# Patient Record
Sex: MALE | Race: WHITE | NOT HISPANIC OR LATINO | URBAN - NONMETROPOLITAN AREA
[De-identification: names, ages, dates, MRNs, and addresses within clinical notes are randomized per-mention and may not be internally consistent; named-entity substitution may affect disease eponyms.]

---

## 2019-07-24 ENCOUNTER — OFFICE VISIT (OUTPATIENT)
Dept: MEDICAL GROUP | Facility: CLINIC | Age: 69
End: 2019-07-24
Payer: MEDICARE

## 2019-07-24 VITALS
HEART RATE: 94 BPM | TEMPERATURE: 99.3 F | HEIGHT: 67 IN | RESPIRATION RATE: 16 BRPM | DIASTOLIC BLOOD PRESSURE: 93 MMHG | BODY MASS INDEX: 31.23 KG/M2 | SYSTOLIC BLOOD PRESSURE: 152 MMHG | OXYGEN SATURATION: 98 % | WEIGHT: 199 LBS

## 2019-07-24 DIAGNOSIS — Z85.828 HISTORY OF SKIN CANCER: ICD-10-CM

## 2019-07-24 DIAGNOSIS — K63.5 POLYP OF COLON, UNSPECIFIED PART OF COLON, UNSPECIFIED TYPE: ICD-10-CM

## 2019-07-24 DIAGNOSIS — S61.216A LACERATION OF RIGHT LITTLE FINGER WITHOUT FOREIGN BODY WITHOUT DAMAGE TO NAIL, INITIAL ENCOUNTER: ICD-10-CM

## 2019-07-24 DIAGNOSIS — R03.0 ELEVATED BLOOD PRESSURE READING: ICD-10-CM

## 2019-07-24 DIAGNOSIS — E78.49 OTHER HYPERLIPIDEMIA: ICD-10-CM

## 2019-07-24 PROCEDURE — 99202 OFFICE O/P NEW SF 15 MIN: CPT | Performed by: PHYSICIAN ASSISTANT

## 2019-07-24 NOTE — PROGRESS NOTES
cc:  followup laceration finger    Subjective:     Jasvir Powers is a 69 y.o. male presenting for follow up laceration finger      Patient presents to the office for follow up laceration finger.  We saw him just briefly yesterday when he came in for a laceration.  However I was concerned as he indicated some decreased mobility and I could actually see the tendon in the incision.  Advised patient that he should go to the ER which she did.  He was seen at [facility].  Since having sutures placed, he has not been able to move his finger.  he can bend the pinky at the MCP joint, but not the DIP joint or PIP joint.  He feels the pain is mild.      BP will run 120/80's at home.  He does monitor it frequently.  He will let us know if it trends up.  He is taking wormwood for his blood pressure.  He also acknowledges a history of upper lipidemia indicating that his cholesterol in the past is usually between 200-2 40.    Patient indicates that he is due for a colonoscopy.  He has a history of colonic polyps which have been an issue since the age of 31.  He states that his last colonoscopy was approximately 2 years ago and was instructed by his gastroenterologist to return in a year.  He is not wanting me to place referral at this time as he is not ready to see them.  He would like to take care of his finger first.    Patient also reports a history of skin cancers indicating that he does need to follow-up with dermatology on a routine basis.  I also offered patient dermatology referral at this time but he would like to hold off again indicating that he would prefer to take care of his finger first.    Review of systems:  See above. Denies any other symptoms unless previously indicate.      No current outpatient prescriptions on file.    Allergies, past medical history, past surgical history, family history, social history reviewed and updated    Objective:     Vitals: /93 (BP Location: Left arm, Patient Position: Sitting,  "BP Cuff Size: Adult)   Pulse 94   Temp 37.4 °C (99.3 °F) (Temporal)   Resp 16   Ht 1.702 m (5' 7\")   Wt 90.3 kg (199 lb)   SpO2 98%   BMI 31.17 kg/m²   General: Alert, pleasant, NAD  EYES:   PERRL, EOMI, no icterus or pallor.  Conjunctivae and lids normal.   HENT:  Normocephalic.  External ears normal.  Neck supple.    Abdomen: overweight  Skin: Warm, dry, no rashes.  Musculoskeletal: Gait is normal.  Moves all extremities well.  Extremities:   Neurological: No tremors, sensation grossly intact, patellar and biceps reflexes 2+ symmetric, tone/strength normal, gait is normal, rapid movements normal, finger-to-nose intact, CN2-12 intact, no pronator drift, romberg negative, no clonus  Psych:  Affect/mood is normal, judgement is good, memory is intact, grooming is appropriate.    Assessment/Plan:     Jasvir was seen today for follow-up.    Diagnoses and all orders for this visit:    Laceration of right little finger without foreign body without damage to nail, initial encounter  -     REFERRAL TO ORTHOPEDICS  Concerned with limited mobility.  Will refer to orthopedics for further evaluation.    Polyp of colon, unspecified part of colon, unspecified type  Declined referral.  Advised patient to follow-up in the next couple of months so that we could submit this if needed.  He will also call his gastroenterologist when he is ready.    Other hyperlipidemia  -     Lipid Profile; Future  -     Comp Metabolic Panel; Future  -     TSH+FREE T4  -     VITAMIN D,25 HYDROXY; Future  Elevated blood pressure reading  -     Lipid Profile; Future  -     Comp Metabolic Panel; Future  -     TSH+FREE T4  -     VITAMIN D,25 HYDROXY; Future    Patient monitors blood pressure at home.  He will let us know if readings trend upward.  Labs have been ordered to evaluate further.    History of skin cancer       again patient declined dermatology referral I am happy to submit when he is ready.    Return if symptoms worsen or fail to " improve.    Please note that this dictation was created using voice recognition software. I have made every reasonable attempt to correct obvious errors, but expect that there are errors of grammar and possible content that I did not discover before finalizing note.

## 2019-07-24 NOTE — LETTER
[facility]  Lolly Mcdaniels P.A.-C.  [address] [facility] [address]  Fax: [phone]   Authorization for Release/Disclosure of   Protected Health Information   Name: HI POWERS : 1950 SSN: [id number]   Address: [address] [address] Phone:    [phone] (home)    I authorize the entity listed below to release/disclose the PHI below to:   [facility]/Lolly Mcdaniels P.A.-C. and Lolly Mcdaniels P.A.-C.   Provider or Entity Name:  [facility]     Address   City, State, [facility]   Phone:      Fax:     Reason for request: continuity of care   Information to be released:    [  ] LAST COLONOSCOPY,  including any PATH REPORT and follow-up  [  ] LAST FIT/COLOGUARD RESULT [  ] LAST DEXA  [  ] LAST MAMMOGRAM  [  ] LAST PAP  [  ] LAST LABS [  ] RETINA EXAM REPORT  [  ] IMMUNIZATION RECORDS  [ x ] Release all info      [  ] Check here and initial the line next to each item to release ALL health information INCLUDING  _____ Care and treatment for drug and / or alcohol abuse  _____ HIV testing, infection status, or AIDS  _____ Genetic Testing    DATES OF SERVICE OR TIME PERIOD TO BE DISCLOSED: _____________  I understand and acknowledge that:  * This Authorization may be revoked at any time by you in writing, except if your health information has already been used or disclosed.  * Your health information that will be used or disclosed as a result of you signing this authorization could be re-disclosed by the recipient. If this occurs, your re-disclosed health information may no longer be protected by State or Federal laws.  * You may refuse to sign this Authorization. Your refusal will not affect your ability to obtain treatment.  * This Authorization becomes effective upon signing and will  on (date) __________.      If no date is indicated, this Authorization will  one (1) year from the signature date.    Name: Hi Powers    Signature:   Date:     2019       PLEASE FAX  REQUESTED RECORDS BACK TO: [phone]

## 2019-07-29 DIAGNOSIS — S61.216A LACERATION OF RIGHT LITTLE FINGER WITHOUT FOREIGN BODY WITHOUT DAMAGE TO NAIL, INITIAL ENCOUNTER: ICD-10-CM

## 2019-07-29 NOTE — PROGRESS NOTES
As patient has not been able to schedule with [facility], I have resubmitted urgent referral to [facility].